# Patient Record
Sex: FEMALE | Race: BLACK OR AFRICAN AMERICAN
[De-identification: names, ages, dates, MRNs, and addresses within clinical notes are randomized per-mention and may not be internally consistent; named-entity substitution may affect disease eponyms.]

---

## 2018-03-05 ENCOUNTER — APPOINTMENT (OUTPATIENT)
Dept: INTERNAL MEDICINE | Facility: CLINIC | Age: 53
End: 2018-03-05
Payer: MEDICAID

## 2018-03-05 ENCOUNTER — NON-APPOINTMENT (OUTPATIENT)
Age: 53
End: 2018-03-05

## 2018-03-05 VITALS
WEIGHT: 198 LBS | HEART RATE: 82 BPM | TEMPERATURE: 97.5 F | OXYGEN SATURATION: 96 % | HEIGHT: 62 IN | DIASTOLIC BLOOD PRESSURE: 90 MMHG | BODY MASS INDEX: 36.44 KG/M2 | SYSTOLIC BLOOD PRESSURE: 148 MMHG | RESPIRATION RATE: 17 BRPM

## 2018-03-05 DIAGNOSIS — Z12.11 ENCOUNTER FOR SCREENING FOR MALIGNANT NEOPLASM OF COLON: ICD-10-CM

## 2018-03-05 DIAGNOSIS — Z00.00 ENCOUNTER FOR GENERAL ADULT MEDICAL EXAMINATION W/OUT ABNORMAL FINDINGS: ICD-10-CM

## 2018-03-05 DIAGNOSIS — Z83.3 FAMILY HISTORY OF DIABETES MELLITUS: ICD-10-CM

## 2018-03-05 DIAGNOSIS — R92.2 INCONCLUSIVE MAMMOGRAM: ICD-10-CM

## 2018-03-05 DIAGNOSIS — Z98.890 OTHER SPECIFIED POSTPROCEDURAL STATES: ICD-10-CM

## 2018-03-05 DIAGNOSIS — Z82.49 FAMILY HISTORY OF ISCHEMIC HEART DISEASE AND OTHER DISEASES OF THE CIRCULATORY SYSTEM: ICD-10-CM

## 2018-03-05 LAB
DATE COLLECTED: NORMAL
HEMOCCULT SP1 STL QL: NEGATIVE

## 2018-03-05 PROCEDURE — 99386 PREV VISIT NEW AGE 40-64: CPT

## 2018-03-05 PROCEDURE — 82270 OCCULT BLOOD FECES: CPT

## 2018-03-05 PROCEDURE — 93000 ELECTROCARDIOGRAM COMPLETE: CPT

## 2018-03-05 RX ORDER — EVE PRIMROSE/LINOLEIC/G-LENIC 1000 MG
CAPSULE ORAL
Refills: 0 | Status: ACTIVE | COMMUNITY

## 2018-03-05 RX ORDER — PNV NO.95/FERROUS FUM/FOLIC AC 28MG-0.8MG
TABLET ORAL
Refills: 0 | Status: ACTIVE | COMMUNITY

## 2018-03-06 LAB
25(OH)D3 SERPL-MCNC: 21.8 NG/ML
ALBUMIN SERPL ELPH-MCNC: 4.6 G/DL
ALP BLD-CCNC: 86 U/L
ALT SERPL-CCNC: 19 U/L
ANION GAP SERPL CALC-SCNC: 11 MMOL/L
APPEARANCE: CLEAR
AST SERPL-CCNC: 19 U/L
BASOPHILS # BLD AUTO: 0.03 K/UL
BASOPHILS NFR BLD AUTO: 0.6 %
BILIRUB SERPL-MCNC: 0.5 MG/DL
BILIRUBIN URINE: NEGATIVE
BLOOD URINE: NEGATIVE
BUN SERPL-MCNC: 9 MG/DL
CALCIUM SERPL-MCNC: 9.7 MG/DL
CHLORIDE SERPL-SCNC: 101 MMOL/L
CHOLEST SERPL-MCNC: 181 MG/DL
CHOLEST/HDLC SERPL: 3.8 RATIO
CO2 SERPL-SCNC: 28 MMOL/L
COLOR: YELLOW
CREAT SERPL-MCNC: 0.69 MG/DL
EOSINOPHIL # BLD AUTO: 0.12 K/UL
EOSINOPHIL NFR BLD AUTO: 2.5 %
GLUCOSE QUALITATIVE U: NEGATIVE MG/DL
GLUCOSE SERPL-MCNC: 81 MG/DL
HBA1C MFR BLD HPLC: 6 %
HCT VFR BLD CALC: 40 %
HDLC SERPL-MCNC: 48 MG/DL
HGB BLD-MCNC: 13.4 G/DL
IMM GRANULOCYTES NFR BLD AUTO: 0 %
KETONES URINE: NEGATIVE
LDLC SERPL CALC-MCNC: 104 MG/DL
LEUKOCYTE ESTERASE URINE: NEGATIVE
LYMPHOCYTES # BLD AUTO: 2.08 K/UL
LYMPHOCYTES NFR BLD AUTO: 42.7 %
MAN DIFF?: NORMAL
MCHC RBC-ENTMCNC: 28.8 PG
MCHC RBC-ENTMCNC: 33.5 GM/DL
MCV RBC AUTO: 85.8 FL
MONOCYTES # BLD AUTO: 0.41 K/UL
MONOCYTES NFR BLD AUTO: 8.4 %
NEUTROPHILS # BLD AUTO: 2.23 K/UL
NEUTROPHILS NFR BLD AUTO: 45.8 %
NITRITE URINE: NEGATIVE
PH URINE: 5.5
PLATELET # BLD AUTO: 253 K/UL
POTASSIUM SERPL-SCNC: 4.3 MMOL/L
PROT SERPL-MCNC: 7.6 G/DL
PROTEIN URINE: NEGATIVE MG/DL
RBC # BLD: 4.66 M/UL
RBC # FLD: 13.2 %
SODIUM SERPL-SCNC: 140 MMOL/L
SPECIFIC GRAVITY URINE: 1.02
TRIGL SERPL-MCNC: 144 MG/DL
TSH SERPL-ACNC: 1.02 UIU/ML
UROBILINOGEN URINE: NEGATIVE MG/DL
WBC # FLD AUTO: 4.87 K/UL

## 2018-05-01 ENCOUNTER — APPOINTMENT (OUTPATIENT)
Dept: INTERNAL MEDICINE | Facility: CLINIC | Age: 53
End: 2018-05-01
Payer: MEDICAID

## 2018-05-01 VITALS
BODY MASS INDEX: 36.07 KG/M2 | SYSTOLIC BLOOD PRESSURE: 160 MMHG | WEIGHT: 196 LBS | HEIGHT: 62 IN | OXYGEN SATURATION: 97 % | TEMPERATURE: 98.2 F | DIASTOLIC BLOOD PRESSURE: 90 MMHG | HEART RATE: 87 BPM | RESPIRATION RATE: 17 BRPM

## 2018-05-01 DIAGNOSIS — Z91.14 PATIENT'S OTHER NONCOMPLIANCE WITH MEDICATION REGIMEN: ICD-10-CM

## 2018-05-01 DIAGNOSIS — R23.2 FLUSHING: ICD-10-CM

## 2018-05-01 DIAGNOSIS — H91.91 UNSPECIFIED HEARING LOSS, RIGHT EAR: ICD-10-CM

## 2018-05-01 PROCEDURE — 99214 OFFICE O/P EST MOD 30 MIN: CPT

## 2018-05-01 RX ORDER — CETIRIZINE HYDROCHLORIDE 10 MG/1
CAPSULE, LIQUID FILLED ORAL
Refills: 0 | Status: ACTIVE | COMMUNITY

## 2018-05-03 RX ORDER — LOSARTAN POTASSIUM AND HYDROCHLOROTHIAZIDE 25; 100 MG/1; MG/1
100-25 TABLET ORAL DAILY
Qty: 30 | Refills: 1 | Status: DISCONTINUED | COMMUNITY
End: 2018-05-03

## 2018-05-14 ENCOUNTER — APPOINTMENT (OUTPATIENT)
Dept: INTERNAL MEDICINE | Facility: CLINIC | Age: 53
End: 2018-05-14
Payer: MEDICAID

## 2018-05-14 VITALS
OXYGEN SATURATION: 98 % | HEART RATE: 93 BPM | DIASTOLIC BLOOD PRESSURE: 90 MMHG | HEIGHT: 62 IN | SYSTOLIC BLOOD PRESSURE: 160 MMHG | RESPIRATION RATE: 16 BRPM | TEMPERATURE: 98.2 F

## 2018-05-14 DIAGNOSIS — E66.9 OBESITY, UNSPECIFIED: ICD-10-CM

## 2018-05-14 DIAGNOSIS — I10 ESSENTIAL (PRIMARY) HYPERTENSION: ICD-10-CM

## 2018-05-14 DIAGNOSIS — E55.9 VITAMIN D DEFICIENCY, UNSPECIFIED: ICD-10-CM

## 2018-05-14 DIAGNOSIS — J30.2 OTHER SEASONAL ALLERGIC RHINITIS: ICD-10-CM

## 2018-05-14 PROCEDURE — 99214 OFFICE O/P EST MOD 30 MIN: CPT

## 2018-07-23 PROBLEM — J30.2 SEASONAL ALLERGIES: Status: ACTIVE | Noted: 2018-03-05

## 2018-09-02 ENCOUNTER — RX RENEWAL (OUTPATIENT)
Age: 53
End: 2018-09-02

## 2018-10-01 ENCOUNTER — RX RENEWAL (OUTPATIENT)
Age: 53
End: 2018-10-01

## 2018-10-01 RX ORDER — CHOLECALCIFEROL (VITAMIN D3) 50 MCG
50 MCG TABLET ORAL
Qty: 30 | Refills: 5 | Status: ACTIVE | COMMUNITY
Start: 2018-09-02 | End: 1900-01-01

## 2018-10-02 ENCOUNTER — RX RENEWAL (OUTPATIENT)
Age: 53
End: 2018-10-02

## 2018-11-03 ENCOUNTER — RX RENEWAL (OUTPATIENT)
Age: 53
End: 2018-11-03

## 2018-11-09 ENCOUNTER — APPOINTMENT (OUTPATIENT)
Dept: INTERNAL MEDICINE | Facility: CLINIC | Age: 53
End: 2018-11-09

## 2019-09-17 ENCOUNTER — APPOINTMENT (OUTPATIENT)
Dept: SURGICAL ONCOLOGY | Facility: CLINIC | Age: 54
End: 2019-09-17
Payer: COMMERCIAL

## 2019-09-17 VITALS
RESPIRATION RATE: 16 BRPM | BODY MASS INDEX: 32.43 KG/M2 | HEIGHT: 63 IN | OXYGEN SATURATION: 98 % | SYSTOLIC BLOOD PRESSURE: 163 MMHG | DIASTOLIC BLOOD PRESSURE: 91 MMHG | HEART RATE: 82 BPM | WEIGHT: 183 LBS

## 2019-09-17 DIAGNOSIS — Z86.79 PERSONAL HISTORY OF OTHER DISEASES OF THE CIRCULATORY SYSTEM: ICD-10-CM

## 2019-09-17 DIAGNOSIS — N60.01 SOLITARY CYST OF RIGHT BREAST: ICD-10-CM

## 2019-09-17 PROCEDURE — 99203 OFFICE O/P NEW LOW 30 MIN: CPT

## 2019-09-17 RX ORDER — LOSARTAN POTASSIUM 100 MG/1
100 TABLET, FILM COATED ORAL DAILY
Qty: 15 | Refills: 1 | Status: DISCONTINUED | COMMUNITY
Start: 2018-05-03 | End: 2019-09-17

## 2019-09-17 RX ORDER — LOSARTAN POTASSIUM AND HYDROCHLOROTHIAZIDE 12.5; 5 MG/1; MG/1
50-12.5 TABLET ORAL
Qty: 7 | Refills: 0 | Status: DISCONTINUED | COMMUNITY
Start: 2018-09-02 | End: 2019-09-17

## 2019-09-17 RX ORDER — MULTIVIT-MIN/FOLIC/VIT K/LYCOP 400-300MCG
50 MCG TABLET ORAL
Qty: 30 | Refills: 3 | Status: DISCONTINUED | COMMUNITY
Start: 2018-03-06 | End: 2019-09-17

## 2019-09-18 NOTE — CONSULT LETTER
[Dear  ___] : Dear  [unfilled], [Consult Letter:] : I had the pleasure of evaluating your patient, [unfilled]. [Consult Closing:] : Thank you very much for allowing me to participate in the care of this patient.  If you have any questions, please do not hesitate to contact me. [Please see my note below.] : Please see my note below. [Sincerely,] : Sincerely, [FreeTextEntry2] :  Luis Eduardo Garces MD [FreeTextEntry3] : Danny Sanchez MD, FICS, FACS\par , Surgical Oncology\par [facility]\par [address]\[place]- [address]\par \par [address]\[place], [place]- [address]\par (mob) [phone]\par (o) [phone]\par (f) [phone]

## 2019-09-18 NOTE — PHYSICAL EXAM
[Normal] : supple, no neck mass and thyroid not enlarged [Normal Supraclavicular Lymph Nodes] : normal supraclavicular lymph nodes [Normal Axillary Lymph Nodes] : normal axillary lymph nodes [Normal] : oriented to person, place and time, with appropriate affect [FreeTextEntry1] : RC present for exam.  [de-identified] : Normal breast inspection and palpation of axillas. No dominant mass or nipple discharge bilaterally.

## 2019-09-18 NOTE — HISTORY OF PRESENT ILLNESS
[de-identified] : Ms. Bernal is a 54 year old female who presents today for an initial consultation.  She  was referred by Dr. Luis Eduardo Garces.\par \par Celestina has a history of left breast cyst aspiration with benign findings.  She underwent her annual b/l mammo/ssono in Feb 2019 and was noted with complex cystic and solid focus in the right breast at 8:00 which may correspond to a stable asymmetry noted on mammogram.  A 6 month follow up right breast US was recommended (Birads 3).  She underwent the right breast sono on 8/21/19 which showed that the focus was stable with a recommendation for annual mammo/sono Jan 2020 to confirm stability (Birads 3).  Today she is without any complaints. Denies palpable breast masses, nipple discharge, skin changes, inversion or breast pain. Denies constitutional symptoms.\par \par PMH otherwise significant for HTN.\par Denies family history of breast or ovarian cancer.\par  \par Menarche: 12 y.o.\par LMP: 49 y.o.\par G1 P 1\par Age at first pregancy: 17 y.o.\par \par Internist: Luis Eduardo Garces MD [phone]\par Gyn: Mark Behar MD

## 2019-09-18 NOTE — ASSESSMENT
[FreeTextEntry1] : 54 year old female with complicated cyst right breast 8:00 (Birads 3).  Recommendation to undergo repeat annual mammo/sono Jan 2020.\par \par \par Plan:\par RTO in 4 months after repeat breast imaging. (Provided with script from her Gyn)\par I have discussed the diagnosis, therapeutic plan and options with the patient at length.  Patient expressed verbal understanding and all questions answered.\par Assuming that her repeat imaging is normal the patient may follow up PRN.

## 2022-04-08 ENCOUNTER — APPOINTMENT (OUTPATIENT)
Dept: CARDIOLOGY | Facility: CLINIC | Age: 57
End: 2022-04-08
Payer: MEDICAID

## 2022-04-08 VITALS
OXYGEN SATURATION: 97 % | TEMPERATURE: 97.5 F | DIASTOLIC BLOOD PRESSURE: 80 MMHG | HEIGHT: 63 IN | SYSTOLIC BLOOD PRESSURE: 154 MMHG | WEIGHT: 179 LBS | BODY MASS INDEX: 31.71 KG/M2 | HEART RATE: 93 BPM

## 2022-04-08 DIAGNOSIS — E78.5 HYPERLIPIDEMIA, UNSPECIFIED: ICD-10-CM

## 2022-04-08 DIAGNOSIS — I10 ESSENTIAL (PRIMARY) HYPERTENSION: ICD-10-CM

## 2022-04-08 DIAGNOSIS — R73.03 PREDIABETES.: ICD-10-CM

## 2022-04-08 PROCEDURE — 93000 ELECTROCARDIOGRAM COMPLETE: CPT

## 2022-04-08 PROCEDURE — 99204 OFFICE O/P NEW MOD 45 MIN: CPT

## 2022-04-08 RX ORDER — ENALAPRIL MALEATE 5 MG/1
TABLET ORAL
Refills: 0 | Status: DISCONTINUED | COMMUNITY
End: 2022-04-08

## 2022-04-08 RX ORDER — ENALAPRIL MALEATE AND HYDROCHLOROTHIAZIDE 5; 12.5 MG/1; MG/1
5-12.5 TABLET ORAL DAILY
Qty: 90 | Refills: 3 | Status: ACTIVE | COMMUNITY

## 2022-04-08 RX ORDER — ASPIRIN ENTERIC COATED TABLETS 81 MG 81 MG/1
81 TABLET, DELAYED RELEASE ORAL
Refills: 0 | Status: DISCONTINUED | COMMUNITY
End: 2022-04-08

## 2022-04-08 NOTE — PHYSICAL EXAM
[Well Developed] : well developed [Well Nourished] : well nourished [No Acute Distress] : no acute distress [Normal Conjunctiva] : normal conjunctiva [Normal Venous Pressure] : normal venous pressure [No Carotid Bruit] : no carotid bruit [Normal S1, S2] : normal S1, S2 [No Rub] : no rub [No Gallop] : no gallop [Clear Lung Fields] : clear lung fields [Good Air Entry] : good air entry [No Respiratory Distress] : no respiratory distress  [Soft] : abdomen soft [Non Tender] : non-tender [No Masses/organomegaly] : no masses/organomegaly [Normal Bowel Sounds] : normal bowel sounds [Normal Gait] : normal gait [No Edema] : no edema [No Cyanosis] : no cyanosis [No Clubbing] : no clubbing [No Varicosities] : no varicosities [No Rash] : no rash [No Skin Lesions] : no skin lesions [Moves all extremities] : moves all extremities [No Focal Deficits] : no focal deficits [Normal Speech] : normal speech [Alert and Oriented] : alert and oriented [Normal memory] : normal memory [de-identified] : 2/6 systolic ejection murmur heard best at left upper sternal border radiating to neck.

## 2022-04-08 NOTE — DISCUSSION/SUMMARY
[___ Month(s)] : in [unfilled] month(s) [FreeTextEntry1] : 57-year-old hypertensive female with prediabetes and elevated cholesterol.  Currently on dietary modification for diabetes and cholesterol.  Follow-up with PCP.  She has normal exercise tolerance but there is evidence of abnormalities noted on EKG which appear new.  Also, cardiac murmur likely aortic valve disease.  We will schedule echocardiogram and exercise for stratification.  Her self-reported blood pressures appear to be within normal limits but have asked her to bring these blood pressure readings with her at her next visit for review.  Continue current medical regimen.  If A1c greater than 6.5% would consider GLP 1 agonist for combination obesity/diabetes.

## 2022-04-08 NOTE — CARDIOLOGY SUMMARY
[de-identified] : 4/8/2022, normal with delayed R wave progression suggestive of anterior infarct?

## 2022-04-08 NOTE — HISTORY OF PRESENT ILLNESS
[FreeTextEntry1] : 57-year-old female referred for management and care of longstanding hypertension recently developed borderline hyperlipidemia/diabetes.\par Patient has longstanding history of hypertension currently being managed with enalapril/hydrochlorothiazide.  She had been on losartan/hydrochlorothiazide several years ago which was stopped after there was a recall on the losartan.  She states that her self measured blood pressures at home are generally in the 120s and there likely is an element of whitecoat effect.\par \par Recently told of elevated A1c and cholesterol, review of her labs done at her primary care physician's office showed an A1c of 6.4% total cholesterol 204 with an LDL of 130.  Patient is currently trying to aggressively diet using the Dash diet but admits she still has issues to many carbohydrates.  She exercises regularly by walking/jogging in [employer].  She denies any recent chest pain, dyspnea, palpitations or syncopal episodes.  Never been evaluated by cardiology in the past.\par \par  with 1 child.  Used to work as a  until the start of Covid.  Nondrinker, non-smoker.